# Patient Record
Sex: FEMALE | Race: WHITE | HISPANIC OR LATINO | URBAN - METROPOLITAN AREA
[De-identification: names, ages, dates, MRNs, and addresses within clinical notes are randomized per-mention and may not be internally consistent; named-entity substitution may affect disease eponyms.]

---

## 2023-01-27 ENCOUNTER — EMERGENCY (EMERGENCY)
Facility: HOSPITAL | Age: 28
LOS: 1 days | Discharge: HOME | End: 2023-01-27
Attending: EMERGENCY MEDICINE
Payer: SELF-PAY

## 2023-01-27 VITALS
TEMPERATURE: 98 F | WEIGHT: 140.88 LBS | SYSTOLIC BLOOD PRESSURE: 144 MMHG | RESPIRATION RATE: 18 BRPM | HEART RATE: 101 BPM | DIASTOLIC BLOOD PRESSURE: 68 MMHG | OXYGEN SATURATION: 97 %

## 2023-01-27 LAB
RAPID RVP RESULT: SIGNIFICANT CHANGE UP
SARS-COV-2 RNA SPEC QL NAA+PROBE: SIGNIFICANT CHANGE UP

## 2023-01-27 PROCEDURE — 99284 EMERGENCY DEPT VISIT MOD MDM: CPT | Mod: 25

## 2023-01-27 PROCEDURE — 0225U NFCT DS DNA&RNA 21 SARSCOV2: CPT

## 2023-01-27 PROCEDURE — 99284 EMERGENCY DEPT VISIT MOD MDM: CPT

## 2023-01-27 PROCEDURE — 96361 HYDRATE IV INFUSION ADD-ON: CPT

## 2023-01-27 PROCEDURE — 96374 THER/PROPH/DIAG INJ IV PUSH: CPT

## 2023-01-27 PROCEDURE — 96375 TX/PRO/DX INJ NEW DRUG ADDON: CPT

## 2023-01-27 RX ORDER — SODIUM CHLORIDE 9 MG/ML
1000 INJECTION INTRAMUSCULAR; INTRAVENOUS; SUBCUTANEOUS ONCE
Refills: 0 | Status: COMPLETED | OUTPATIENT
Start: 2023-01-27 | End: 2023-01-27

## 2023-01-27 RX ORDER — POLYMYXIN B SULF/TRIMETHOPRIM 10000-1/ML
1 DROPS OPHTHALMIC (EYE) ONCE
Refills: 0 | Status: COMPLETED | OUTPATIENT
Start: 2023-01-27 | End: 2023-01-27

## 2023-01-27 RX ORDER — KETOROLAC TROMETHAMINE 30 MG/ML
15 SYRINGE (ML) INJECTION ONCE
Refills: 0 | Status: DISCONTINUED | OUTPATIENT
Start: 2023-01-27 | End: 2023-01-27

## 2023-01-27 RX ORDER — FAMOTIDINE 10 MG/ML
40 INJECTION INTRAVENOUS ONCE
Refills: 0 | Status: COMPLETED | OUTPATIENT
Start: 2023-01-27 | End: 2023-01-27

## 2023-01-27 RX ORDER — METOCLOPRAMIDE HCL 10 MG
10 TABLET ORAL ONCE
Refills: 0 | Status: COMPLETED | OUTPATIENT
Start: 2023-01-27 | End: 2023-01-27

## 2023-01-27 RX ORDER — IBUPROFEN 200 MG
600 TABLET ORAL ONCE
Refills: 0 | Status: COMPLETED | OUTPATIENT
Start: 2023-01-27 | End: 2023-01-27

## 2023-01-27 RX ADMIN — Medication 1 DROP(S): at 21:39

## 2023-01-27 RX ADMIN — Medication 15 MILLIGRAM(S): at 21:35

## 2023-01-27 RX ADMIN — SODIUM CHLORIDE 1000 MILLILITER(S): 9 INJECTION INTRAMUSCULAR; INTRAVENOUS; SUBCUTANEOUS at 21:37

## 2023-01-27 RX ADMIN — Medication 600 MILLIGRAM(S): at 22:37

## 2023-01-27 RX ADMIN — Medication 10 MILLIGRAM(S): at 21:39

## 2023-01-27 RX ADMIN — FAMOTIDINE 40 MILLIGRAM(S): 10 INJECTION INTRAVENOUS at 22:37

## 2023-01-27 NOTE — ED ADULT NURSE NOTE - OBJECTIVE STATEMENT
Patient comes to the ED with C/o left eye redness and chills that started today. Patient also states hx of migraines, had migraine earlier today and took migraine medication at home with some improvement.

## 2023-01-27 NOTE — ED PROVIDER NOTE - NS ED ATTENDING STATEMENT MOD
This was a shared visit with the KAYLI. I reviewed and verified the documentation and independently performed the documented:

## 2023-01-27 NOTE — ED PROVIDER NOTE - OBJECTIVE STATEMENT
26 y/o female with hx of migraines presents to the ED c/o right sided headache for the past day. notes feels similar to migraines int he past but took home meds without relief. Pt also noting left eye redness and discharge. admits to congestion. Denies fevers, chills, numbness, tingling, coldness.

## 2023-01-27 NOTE — ED PROVIDER NOTE - NSFOLLOWUPINSTRUCTIONS_ED_ALL_ED_FT
1) Richard un seguimiento con dalton médico de atención primaria en los próximos 5-7 días. Llame mañana para concertar miah salvador. Si no puede hacer un seguimiento con dalton médico de atención primaria, regrese al servicio de urgencias por cualquier problema urgente.  2) Se le entregó miah copia de las pruebas realizadas hoy. Traiga los resultados y revíselos con dalton médico de atención primaria.  3) Si tiene algún empeoramiento de los síntomas o cualquier otra inquietud, regrese al servicio de urgencias de inmediato.  4) Continúe tomando gordon medicamentos caseros según las indicaciones.

## 2023-01-27 NOTE — ED PROVIDER NOTE - PATIENT PORTAL LINK FT
You can access the FollowMyHealth Patient Portal offered by [facility] by registering at the following website: [email or web address]. By joining [employer]’s FollowMyHealth portal, you will also be able to view your health information using other applications (apps) compatible with our system.

## 2023-01-27 NOTE — ED PROVIDER NOTE - ATTENDING APP SHARED VISIT CONTRIBUTION OF CARE
I, Yossi Lopez, have personally performed a face to face diagnostic evaluation on this patient. I have reviewed the KAYLI note and agree with the history, exam and plan of care, except as noted.    27-year-old female history of migraines presents to the ED with headache and left eye redness and nasal congestion.  No fever.  On exam, corneal injection of left eye, no focal neurological deficit.  Symptoms likely related to sinus congestion.   Patient given Pepcid, Motrin, Toradol, and Reglan with improvement.  Polytrim eyedrops given for left eye conjunctivitis.  RVP sent.  Outpatient follow-up.

## 2023-01-27 NOTE — ED ADULT TRIAGE NOTE - CHIEF COMPLAINT QUOTE
patient left eye redness and chills that started today. also states hx of migraines, had migraine earlier today and took migraine medication at home with some improvement.

## 2023-01-27 NOTE — ED PROVIDER NOTE - CLINICAL SUMMARY MEDICAL DECISION MAKING FREE TEXT BOX
pt with hx of migraines, c/o migraines and left eye redness, pt with discharge in the am, PERRLA, EOMI, conjunctival injections, will treat with polytrim, congestion of the nose, likely headache related to sinusitis, neuro intact, feeling better after medsa dn fluids, rvp pending, stable for dc
